# Patient Record
Sex: MALE | Race: WHITE | NOT HISPANIC OR LATINO | URBAN - NONMETROPOLITAN AREA
[De-identification: names, ages, dates, MRNs, and addresses within clinical notes are randomized per-mention and may not be internally consistent; named-entity substitution may affect disease eponyms.]

---

## 2018-10-03 NOTE — PATIENT DISCUSSION
""""" ""Informed patient that their capsular opacification is not visually significant or does not meet the minimum criteria for capsulotomy.  Recommended attention to PCO symptoms

## 2020-02-03 ENCOUNTER — IMPORTED ENCOUNTER (OUTPATIENT)
Dept: URBAN - NONMETROPOLITAN AREA CLINIC 1 | Facility: CLINIC | Age: 70
End: 2020-02-03

## 2020-02-03 PROBLEM — H02.834: Noted: 2020-02-03

## 2020-02-03 PROBLEM — H18.51: Noted: 2020-02-03

## 2020-02-03 PROBLEM — H25.813: Noted: 2020-02-29

## 2020-02-03 PROBLEM — H02.831: Noted: 2020-02-03

## 2020-02-03 PROBLEM — H55.01: Noted: 2020-02-03

## 2020-02-03 PROBLEM — H25.13: Noted: 2020-02-03

## 2020-02-03 PROCEDURE — 99213 OFFICE O/P EST LOW 20 MIN: CPT

## 2020-02-03 PROCEDURE — 92015 DETERMINE REFRACTIVE STATE: CPT

## 2020-02-03 NOTE — PATIENT DISCUSSION
- Cataracts ([place]) OU:-  Educated patient on condition/discussed diagnosis in detail with patient-  Visually significant with patient complaining of glare and/or decreased vision. -  Recommend cataract evaluation with Dr. Nicki Arriola soon patient agrees with plan. -  Next available w/ JS for Cat Eval Compound Hypermetropic OU w/ Presbuyopia -  Educated pt on disganosis -  new Spec Rx dispense today . -  Reccomend lens that best fits pt and comrfort -  Monitor yearly or PRN Horizontal jerk nystagmus conjenital mild amblyopia. Corneal Map Dystrophy OUDermatochalasis UL OUBleph sx eval PRN Bleph VF PRN

## 2020-02-29 ENCOUNTER — IMPORTED ENCOUNTER (OUTPATIENT)
Dept: URBAN - NONMETROPOLITAN AREA CLINIC 1 | Facility: CLINIC | Age: 70
End: 2020-02-29

## 2020-02-29 PROCEDURE — 92014 COMPRE OPH EXAM EST PT 1/>: CPT

## 2020-02-29 NOTE — PATIENT DISCUSSION
Cataract(s)-Visually significant cataract OU .-Cataract(s) causing symptomatic impairment of visual function not correctable with a tolerable change in glasses or contact lenses lighting or non-operative means resulting in specific activity limitations and/or participation restrictions including but not limited to reading viewing television driving or meeting vocational or recreational needs. -Expectation is clearer vision and functional improvement in symptoms as well as reduced glare disability after cataract removal.-Order IOLMaster and OPD today. -Recommend Toric IOL  /  LenSx based on today's OPD testing and lifestyle questionnaire.-All questions were answered regarding surgery including pre and post-op medications appointments activity restrictions and anesthetic usage.-The risks benefits and alternatives and special risk factors for the patient were discussed in detail including but not limited to: bleeding infection retinal detachment vitreous loss problems with the implant and possible need for additional surgery.-Although rare the possibility of complete vision loss was discussed.-The possible need for glasses post-operatively was discussed.-Order medical clearance exam.-Patient elects to proceed with cataract surgery OD . Will schedule at patient's convenience and re-evaluate OS  in the future. **Start lotemax and AT'S for one week and come in for repeat testing. **Extra Provisc OD **Consider Retrobulbar block **Nystagmus fast component to the left -Discussed this finding with patient today. Corneal Map Dystrophy OU-Discussed findings with patient today-Warned patient that the toric will reduce the astigmatism but it will not get rid of it can not promise that.

## 2020-07-24 ENCOUNTER — IMPORTED ENCOUNTER (OUTPATIENT)
Dept: URBAN - NONMETROPOLITAN AREA CLINIC 1 | Facility: CLINIC | Age: 70
End: 2020-07-24

## 2020-07-24 PROCEDURE — 92014 COMPRE OPH EXAM EST PT 1/>: CPT

## 2020-07-24 NOTE — PATIENT DISCUSSION
Cataract(s)-Visually significant cataract OU .-Cataract(s) causing symptomatic impairment of visual function not correctable with a tolerable change in glasses or contact lenses lighting or non-operative means resulting in specific activity limitations and/or participation restrictions including but not limited to reading viewing television driving or meeting vocational or recreational needs. -Expectation is clearer vision and functional improvement in symptoms as well as reduced glare disability after cataract removal.-Order IOLMaster and OPD today.-Discussed in depth with patient that due to nystagmus it makes the astigmatism very difficult to pin point. Do not recommend LenSx or Toric but instead recommend standard/traditional surgery.-All questions were answered regarding surgery including pre and post-op medications appointments activity restrictions and anesthetic usage.-The risks benefits and alternatives and special risk factors for the patient were discussed in detail including but not limited to: bleeding infection retinal detachment vitreous loss problems with the implant and possible need for additional surgery.-Although rare the possibility of complete vision loss was discussed.-The possible need for glasses post-operatively was discussed.-Order medical clearance exam.-Patient elects to proceed with cataract surgery OD . Will schedule at patient's convenience and re-evaluate OS  in the future. Postop inflamation anticipated discussed Dextenza insertion after surgery. Nystagmus fast component to the left -Discussed this finding with patient today.-Discussed that it is difficult to have accurate measurements due to Corneal Map Dystrophy OU-Discussed findings with patient today-Warned patient that the toric will reduce the astigmatism but it will not get rid of it can not promise that.

## 2020-08-19 PROBLEM — H25.13: Noted: 2020-08-19

## 2020-08-19 PROBLEM — H02.831: Noted: 2020-08-19

## 2020-08-19 PROBLEM — H18.51: Noted: 2020-08-19

## 2020-08-19 PROBLEM — H55.01: Noted: 2020-08-19

## 2020-08-20 ENCOUNTER — IMPORTED ENCOUNTER (OUTPATIENT)
Dept: URBAN - NONMETROPOLITAN AREA CLINIC 1 | Facility: CLINIC | Age: 70
End: 2020-08-20

## 2020-08-20 PROCEDURE — 99214 OFFICE O/P EST MOD 30 MIN: CPT

## 2020-08-24 PROBLEM — Z01.818: Noted: 2020-08-24

## 2020-08-24 PROBLEM — H25.813: Noted: 2020-08-24

## 2020-09-01 ENCOUNTER — IMPORTED ENCOUNTER (OUTPATIENT)
Dept: URBAN - NONMETROPOLITAN AREA CLINIC 1 | Facility: CLINIC | Age: 70
End: 2020-09-01

## 2020-09-01 PROBLEM — Z98.41: Noted: 2020-09-01

## 2020-09-01 PROBLEM — H25.812: Noted: 2020-09-01

## 2020-09-01 PROBLEM — Z01.818: Noted: 2020-09-01

## 2020-09-01 PROCEDURE — 99024 POSTOP FOLLOW-UP VISIT: CPT

## 2020-09-01 NOTE — PATIENT DISCUSSION
s/p PC IOL OD Stand/Trad 8/31/2020-  discussed findings w/patient-  Pt doing well s/p PCIOL. -  Continue post-op gtts according to instruction sheet and sleep with eye shield over eye for 7 nights. -  Avoid bending at the waist lifting anything over 5lbs and dirty or dafne environments.-  RTC as scheduled or prn

## 2020-09-04 ENCOUNTER — IMPORTED ENCOUNTER (OUTPATIENT)
Dept: URBAN - NONMETROPOLITAN AREA CLINIC 1 | Facility: CLINIC | Age: 70
End: 2020-09-04

## 2020-09-04 PROCEDURE — 99024 POSTOP FOLLOW-UP VISIT: CPT

## 2020-09-09 PROBLEM — Z98.41: Noted: 2020-09-01

## 2020-09-09 PROBLEM — H25.812: Noted: 2020-09-09

## 2020-09-15 ENCOUNTER — IMPORTED ENCOUNTER (OUTPATIENT)
Dept: URBAN - NONMETROPOLITAN AREA CLINIC 1 | Facility: CLINIC | Age: 70
End: 2020-09-15

## 2020-09-15 PROBLEM — Z98.42: Noted: 2020-09-15

## 2020-09-15 PROCEDURE — 99024 POSTOP FOLLOW-UP VISIT: CPT

## 2020-09-15 NOTE — PATIENT DISCUSSION
s/p PC IOL OS Stand/Trad 9/14/2020-  discussed findings w/patient-  Pt doing well s/p PCIOL. -  Continue post-op gtts according to instruction sheet and sleep with eye shield over eye for 7 nights. -  Avoid bending at the waist lifting anything over 5lbs and dirty or dafne environments.-  RTC as scheduled or prn

## 2020-09-23 ENCOUNTER — IMPORTED ENCOUNTER (OUTPATIENT)
Dept: URBAN - NONMETROPOLITAN AREA CLINIC 1 | Facility: CLINIC | Age: 70
End: 2020-09-23

## 2020-09-23 PROCEDURE — 99024 POSTOP FOLLOW-UP VISIT: CPT

## 2020-09-23 NOTE — PATIENT DISCUSSION
s/p PC IOL OS Stand/Trad 9/14/2020-  discussed findings w/patient-  Pt doing well at 1 week s/p PCIOL. -  Continue post-op gtts according to instruction sheet.-  Okay to resume usual activites and d/c eye shield-  RTC 3 weeks PORM

## 2020-10-19 ENCOUNTER — IMPORTED ENCOUNTER (OUTPATIENT)
Dept: URBAN - NONMETROPOLITAN AREA CLINIC 1 | Facility: CLINIC | Age: 70
End: 2020-10-19

## 2020-10-19 PROCEDURE — 99024 POSTOP FOLLOW-UP VISIT: CPT

## 2020-10-19 PROCEDURE — 92015 DETERMINE REFRACTIVE STATE: CPT

## 2020-10-19 NOTE — PATIENT DISCUSSION
s/p PC IOL OS Stand/Trad 9/14/2020-  discussed findings w/patient-  new spectacle Rx issued-  RTC 3 mo DFE or prn; 's Notes: MR 10/19/2020

## 2021-01-18 ENCOUNTER — PREPPED CHART (OUTPATIENT)
Dept: URBAN - NONMETROPOLITAN AREA CLINIC 4 | Facility: CLINIC | Age: 71
End: 2021-01-18

## 2021-01-18 ENCOUNTER — IMPORTED ENCOUNTER (OUTPATIENT)
Dept: URBAN - NONMETROPOLITAN AREA CLINIC 1 | Facility: CLINIC | Age: 71
End: 2021-01-18

## 2021-01-18 PROBLEM — H26.493: Noted: 2021-01-18

## 2021-01-18 PROBLEM — Z96.1: Noted: 2021-01-18

## 2021-01-18 PROCEDURE — 99213 OFFICE O/P EST LOW 20 MIN: CPT

## 2021-01-18 NOTE — PATIENT DISCUSSION
Pseudophakia w/PCO OU-  discussed findings w/patient-  mild PCO OU-  no treatment indicated at this time-  RTC 1 year Complete or prn; 's Notes:  10/19/2020DFE 1/18/2021

## 2022-02-23 ASSESSMENT — VISUAL ACUITY
OS_CC: 20/30
OD_CC: 20/30
OU_CC: 20/30
OU_CC: J1+

## 2022-02-23 ASSESSMENT — TONOMETRY
OD_IOP_MMHG: 16
OS_IOP_MMHG: 17

## 2022-02-25 ENCOUNTER — COMPREHENSIVE EXAM (OUTPATIENT)
Dept: URBAN - NONMETROPOLITAN AREA CLINIC 4 | Facility: CLINIC | Age: 72
End: 2022-02-25

## 2022-02-25 DIAGNOSIS — H52.4: ICD-10-CM

## 2022-02-25 DIAGNOSIS — H26.493: ICD-10-CM

## 2022-02-25 PROCEDURE — 92014 COMPRE OPH EXAM EST PT 1/>: CPT

## 2022-02-25 PROCEDURE — 92015 DETERMINE REFRACTIVE STATE: CPT

## 2022-02-25 ASSESSMENT — VISUAL ACUITY
OD_BAT: 20/60-2
OS_CC: 20/40-2
OS_BAT: 20/70
OD_CC: 20/50-2
OD_PH: 20/40+2

## 2022-02-25 ASSESSMENT — TONOMETRY
OS_IOP_MMHG: 13
OD_IOP_MMHG: 15

## 2022-02-25 NOTE — PATIENT DISCUSSION
(Surgical)  Visually Significant secondary to glare; schedule YAG Cap. Pros, cons, risks and benefits discussed with patient. Patient wishes to proceed.

## 2022-02-25 NOTE — PATIENT DISCUSSION
Stable. Observe. + LYME ON LABS: DR. LANG: + LYME ON LABS:  DISCUSSE WITH DR. LANG: PATIENT HAS KNOWN LYME AND IS ON AUGMENTIN BY PMD.

## 2022-04-10 ASSESSMENT — VISUAL ACUITY
OS_GLARE: 20/400
OD_SC: 20/30
OD_SC: 20/40
OS_CC: 20/40-1
OD_CC: 20/80
OS_CC: 20/200
OS_PH: 20/70
OS_PH: 20/40
OD_GLARE: 20/400
OS_AM: 20/30
OS_PH: 20/100
OS_AM: 20/30
OD_SC: 20/40-1
OS_PH: 20/40-2
OS_PH: 20/40
OD_PAM: 20/30
OS_SC: 20/40-1
OS_CC: 20/400
OS_CC: 20/40
OU_SC: 20/30
OS_AM: 20/30
OD_PH: 20/40
OS_SC: 20/50
OD_CC: 20/40-1
OD_GLARE: 20/70
OD_CC: 20/40
OD_CC: 20/40+
OD_PAM: 20/25
OS_CC: 20/60
OS_SC: 20/40
OD_PH: 20/50
OD_PH: 20/70-1
OD_CC: 20/60
OS_GLARE: 20/60
OU_CC: J1+
OS_GLARE: 20/400
OS_SC: 20/30
OD_SC: 20/60
OS_CC: 20/200
OD_PH: 20/40-2
OS_PH: 20/60
OS_CC: 20/60-
OD_CC: 20/40
OD_PH: 20/70
OS_CC: 20/40-1
OD_CC: 20/70+2

## 2022-04-10 ASSESSMENT — TONOMETRY
OD_IOP_MMHG: 17
OS_IOP_MMHG: 17
OS_IOP_MMHG: 16
OD_IOP_MMHG: 18
OS_IOP_MMHG: 17
OD_IOP_MMHG: 18
OS_IOP_MMHG: 17
OS_IOP_MMHG: 18
OS_IOP_MMHG: 17
OD_IOP_MMHG: 18
OD_IOP_MMHG: 17
OD_IOP_MMHG: 16
OS_IOP_MMHG: 14
OD_IOP_MMHG: 14
OD_IOP_MMHG: 18
OD_IOP_MMHG: 16
OS_IOP_MMHG: 16
OS_IOP_MMHG: 17

## 2022-10-26 ENCOUNTER — CONSULTATION/EVALUATION (OUTPATIENT)
Dept: RURAL CLINIC 1 | Facility: CLINIC | Age: 72
End: 2022-10-26

## 2022-10-26 DIAGNOSIS — H26.493: ICD-10-CM

## 2022-10-26 PROCEDURE — 66821 AFTER CATARACT LASER SURGERY: CPT

## 2022-10-26 PROCEDURE — 99214 OFFICE O/P EST MOD 30 MIN: CPT

## 2022-10-26 ASSESSMENT — VISUAL ACUITY
OD_CC: 20/60+2
OS_CC: 20/70
OD_PH: 20/60
OD_BAT: 20/60
OS_BAT: 20/80
OS_PH: 20/60

## 2022-10-26 ASSESSMENT — TONOMETRY
OS_IOP_MMHG: 16
OD_IOP_MMHG: 16

## 2022-10-26 NOTE — PROCEDURE NOTE: CLINICAL
PROCEDURE NOTE: YAG Capsulotomy OS. Diagnosis: Other Secondary Cataract. Anesthesia: Topical. The purpose and nature of the procedure, possible alternative methods of treatment, the risks involved and the possibility of complications were discussed with patient. The Patient wishes to proceed and the consent was signed. 1 gtt Prolensa applied. The laser was then performed under topical anesthesia with no complications. Post op instructions were given to patient as well as a follow-up appointment. Patient was advised to call our office if any questions or concerns 2.2 MJ # 68 shots.

## 2022-11-16 ENCOUNTER — POST-OP (OUTPATIENT)
Dept: RURAL CLINIC 1 | Facility: CLINIC | Age: 72
End: 2022-11-16

## 2022-11-16 DIAGNOSIS — Z98.890: ICD-10-CM

## 2022-11-16 PROCEDURE — 99024 POSTOP FOLLOW-UP VISIT: CPT

## 2022-11-16 ASSESSMENT — VISUAL ACUITY
OD_CC: 20/60
OS_CC: 20/50
OS_PH: 20/40

## 2022-11-16 ASSESSMENT — TONOMETRY
OS_IOP_MMHG: 16
OD_IOP_MMHG: 16

## 2022-12-09 ENCOUNTER — CLINIC PROCEDURE ONLY (OUTPATIENT)
Dept: RURAL CLINIC 1 | Facility: CLINIC | Age: 72
End: 2022-12-09

## 2022-12-09 PROCEDURE — 66821 AFTER CATARACT LASER SURGERY: CPT

## 2022-12-09 ASSESSMENT — TONOMETRY
OD_IOP_MMHG: 16
OS_IOP_MMHG: 16

## 2022-12-09 ASSESSMENT — VISUAL ACUITY
OS_CC: 20/50
OD_CC: 20/60

## 2022-12-09 NOTE — PROCEDURE NOTE: CLINICAL
PROCEDURE NOTE: YAG Capsulotomy OD. Diagnosis: Other Secondary Cataract. Anesthesia: Topical. The purpose and nature of the procedure, possible alternative methods of treatment, the risks involved and the possibility of complications were discussed with patient. The Patient wishes to proceed and the consent was signed. 1 gtt Prolensa applied. The laser was then performed under topical anesthesia with no complications. Post op instructions were given to patient as well as a follow-up appointment. Patient was advised to call our office if any questions or concerns. 1.9 MJ # 30.

## 2022-12-16 ENCOUNTER — POST-OP (OUTPATIENT)
Dept: RURAL CLINIC 1 | Facility: CLINIC | Age: 72
End: 2022-12-16

## 2022-12-16 PROCEDURE — 99024 POSTOP FOLLOW-UP VISIT: CPT

## 2022-12-16 ASSESSMENT — VISUAL ACUITY
OS_CC: 20/80
OD_CC: 20/40
OS_PH: 20/70

## 2022-12-16 ASSESSMENT — TONOMETRY
OS_IOP_MMHG: 14
OD_IOP_MMHG: 15

## 2024-04-10 ENCOUNTER — ESTABLISHED PATIENT (OUTPATIENT)
Dept: RURAL CLINIC 1 | Facility: CLINIC | Age: 74
End: 2024-04-10

## 2024-04-10 DIAGNOSIS — H55.09: ICD-10-CM

## 2024-04-10 DIAGNOSIS — Z96.1: ICD-10-CM

## 2024-04-10 PROCEDURE — 92014 COMPRE OPH EXAM EST PT 1/>: CPT

## 2024-04-10 ASSESSMENT — VISUAL ACUITY
OD_CC: 20/50
OS_CC: 20/40
OU_CC: 20/60
OD_CC: 20/40
OS_CC: 20/50
OU_CC: 20/40

## 2024-04-10 ASSESSMENT — TONOMETRY
OS_IOP_MMHG: 14
OD_IOP_MMHG: 14